# Patient Record
Sex: MALE | Race: WHITE
[De-identification: names, ages, dates, MRNs, and addresses within clinical notes are randomized per-mention and may not be internally consistent; named-entity substitution may affect disease eponyms.]

---

## 2018-09-07 ENCOUNTER — HOSPITAL ENCOUNTER (EMERGENCY)
Dept: HOSPITAL 31 - C.ER | Age: 28
Discharge: HOME | End: 2018-09-07
Payer: MEDICAID

## 2018-09-07 VITALS
DIASTOLIC BLOOD PRESSURE: 68 MMHG | OXYGEN SATURATION: 97 % | HEART RATE: 86 BPM | SYSTOLIC BLOOD PRESSURE: 118 MMHG | TEMPERATURE: 98.4 F | RESPIRATION RATE: 20 BRPM

## 2018-09-07 DIAGNOSIS — R51: Primary | ICD-10-CM

## 2018-09-07 LAB
ALBUMIN SERPL-MCNC: 4.3 G/DL (ref 3.5–5)
ALBUMIN/GLOB SERPL: 1.5 {RATIO} (ref 1–2.1)
ALT SERPL-CCNC: 26 U/L (ref 21–72)
AST SERPL-CCNC: 21 U/L (ref 17–59)
BASOPHILS # BLD AUTO: 0.1 K/UL (ref 0–0.2)
BASOPHILS NFR BLD: 0.6 % (ref 0–2)
BUN SERPL-MCNC: 12 MG/DL (ref 9–20)
CALCIUM SERPL-MCNC: 9.2 MG/DL (ref 8.6–10.4)
EOSINOPHIL # BLD AUTO: 0.1 K/UL (ref 0–0.7)
EOSINOPHIL NFR BLD: 0.6 % (ref 0–4)
ERYTHROCYTE [DISTWIDTH] IN BLOOD BY AUTOMATED COUNT: 12.5 % (ref 11.5–14.5)
GFR NON-AFRICAN AMERICAN: > 60
HGB BLD-MCNC: 13.9 G/DL (ref 12–18)
LYMPHOCYTES # BLD AUTO: 3.4 K/UL (ref 1–4.3)
LYMPHOCYTES NFR BLD AUTO: 28.7 % (ref 20–40)
MCH RBC QN AUTO: 30.4 PG (ref 27–31)
MCHC RBC AUTO-ENTMCNC: 34.1 G/DL (ref 33–37)
MCV RBC AUTO: 89 FL (ref 80–94)
MONOCYTES # BLD: 0.8 K/UL (ref 0–0.8)
MONOCYTES NFR BLD: 6.6 % (ref 0–10)
NEUTROPHILS # BLD: 7.5 K/UL (ref 1.8–7)
NEUTROPHILS NFR BLD AUTO: 63.5 % (ref 50–75)
NRBC BLD AUTO-RTO: 0 % (ref 0–2)
PLATELET # BLD: 284 K/UL (ref 130–400)
PMV BLD AUTO: 8.5 FL (ref 7.2–11.7)
RBC # BLD AUTO: 4.59 MIL/UL (ref 4.4–5.9)
WBC # BLD AUTO: 11.7 K/UL (ref 4.8–10.8)

## 2018-09-07 NOTE — C.PDOC
History Of Present Illness


28 y/o male, with no PMHx, presents to ED c/o gradual onset of headache that 

developed last night. Pt complains of left side facial tingling, no weakness. 

She is also concerned about forehead facial muscle twitching. Notes headache 

subsided now. Denies any other complaints. 





Time Seen by Provider: 09/07/18 15:42


Chief Complaint (Nursing): Headache


History Per: Patient


History/Exam Limitations: no limitations





Past Medical History


Reviewed: Historical Data, Nursing Documentation, Vital Signs


Vital Signs: 


 Last Vital Signs











Temp  98.1 F   09/07/18 14:52


 


Pulse  90   09/07/18 14:52


 


Resp  24   09/07/18 14:52


 


BP  124/93 H  09/07/18 14:52


 


Pulse Ox  99   09/07/18 17:08











Family History: States: Unknown Family Hx





- Social History


Hx Tobacco Use: No


Hx Alcohol Use: No


Hx Substance Use: No





- Immunization History


Hx Tetanus Toxoid Vaccination: No


Hx Influenza Vaccination: No


Hx Pneumococcal Vaccination: No





Review Of Systems


Except As Marked, All Systems Reviewed And Found Negative.


Constitutional: Negative for: Fever, Chills


Cardiovascular: Negative for: Chest Pain


Respiratory: Negative for: Shortness of Breath


Gastrointestinal: Negative for: Nausea, Vomiting


Neurological: Positive for: Headache.  Negative for: Weakness, Dizziness





Physical Exam





- Physical Exam


Appears: Non-toxic, No Acute Distress


Skin: Normal Color, Warm, Dry


Head: Atraumatic, Normacephalic


Eye(s): bilateral: Normal Inspection


Oral Mucosa: Moist


Neck: Supple


Cardiovascular: Rhythm Regular


Respiratory: Normal Breath Sounds, No Rales, No Rhonchi, No Wheezing


Gastrointestinal/Abdominal: Soft, No Tenderness


Extremity: Normal ROM


Neurological/Psych: Oriented x3, Normal Speech





ED Course And Treatment





- Laboratory Results


Result Diagrams: 


 09/07/18 16:45





 09/07/18 16:45


O2 Sat by Pulse Oximetry: 99


Pulse Ox Interpretation: Normal





Medical Decision Making


Medical Decision Making: 


Plan:


Blood work





Assessment: Headache


patient remained asymptomatic no complaints.  Electrolytes normal. Will 

discharge home to follow up with pmd within 2 days. 





Disposition





- Disposition


Referrals: 


[facility] [Outside]


Consuelo Vuong MD [Staff Provider] - 


Disposition: HOME/ ROUTINE


Disposition Time: 17:51


Condition: STABLE


Additional Instructions: 


follow up with your doctor within 2 days


follow up with neurology within 2 days 


call to make an appointment


return to ER if symptoms worsens or progress 


Instructions:  Headache, Adult (DC)


Forms:  [facility] (English), General Discharge Instructions





- Clinical Impression


Clinical Impression: 


 Headache








- Scribe Statement


The provider has reviewed the documentation as recorded by the Scribe





KP





All medical record entries made by the Scribe were at my direction and 

personally dictated by me. I have reviewed the chart and agree that the record 

accurately reflects my personal performance of the history, physical exam, 

medical decision making, and the department course for this patient. I have 

also personally directed, reviewed, and agree with the discharge instructions 

and disposition.

## 2018-09-07 NOTE — C.PDOC
Time Seen by Provider: 09/07/18 15:42


Chief Complaint (Nursing): Headache





Past Medical History


Vital Signs: 





 Last Vital Signs











Temp  98.1 F   09/07/18 14:52


 


Pulse  90   09/07/18 14:52


 


Resp  24   09/07/18 14:52


 


BP  124/93 H  09/07/18 14:52


 


Pulse Ox  99   09/07/18 14:52














- Social History


Hx Tobacco Use: No


Hx Alcohol Use: No


Hx Substance Use: No





- Immunization History


Hx Tetanus Toxoid Vaccination: No


Hx Influenza Vaccination: No


Hx Pneumococcal Vaccination: No





ED Course And Treatment


O2 Sat by Pulse Oximetry: 99





Disposition





- Disposition